# Patient Record
Sex: MALE | Race: BLACK OR AFRICAN AMERICAN | NOT HISPANIC OR LATINO
[De-identification: names, ages, dates, MRNs, and addresses within clinical notes are randomized per-mention and may not be internally consistent; named-entity substitution may affect disease eponyms.]

---

## 2020-06-25 PROBLEM — Z00.00 ENCOUNTER FOR PREVENTIVE HEALTH EXAMINATION: Status: ACTIVE | Noted: 2020-06-25

## 2020-06-29 ENCOUNTER — APPOINTMENT (OUTPATIENT)
Dept: NEUROLOGY | Facility: CLINIC | Age: 80
End: 2020-06-29
Payer: MEDICARE

## 2020-06-29 VITALS
HEART RATE: 81 BPM | DIASTOLIC BLOOD PRESSURE: 82 MMHG | HEIGHT: 69 IN | WEIGHT: 220 LBS | SYSTOLIC BLOOD PRESSURE: 185 MMHG | BODY MASS INDEX: 32.58 KG/M2 | TEMPERATURE: 97.3 F

## 2020-06-29 DIAGNOSIS — G45.9 TRANSIENT CEREBRAL ISCHEMIC ATTACK, UNSPECIFIED: ICD-10-CM

## 2020-06-29 DIAGNOSIS — Z86.73 PERSONAL HISTORY OF TRANSIENT ISCHEMIC ATTACK (TIA), AND CEREBRAL INFARCTION W/OUT RESIDUAL DEFICITS: ICD-10-CM

## 2020-06-29 PROCEDURE — 99205 OFFICE O/P NEW HI 60 MIN: CPT

## 2020-06-29 NOTE — REASON FOR VISIT
[Consultation] : a consultation visit [FreeTextEntry1] : Numbness in left arm and hand, intermittent numbness to left side.

## 2020-06-29 NOTE — ASSESSMENT
[FreeTextEntry1] : Hank Mueller is a 79 year old man with probable left ulnar neuropathy and TIA. \par \par Left ulnar neuropathy-EMG.\par  OT eval. \par \par TIA- he is already on statin, antiplatelet agent and anti-HTN medications for stroke risk reduction. DM control. \par I recommend MRI brain, MRA head and neck w/o and echocardiogram. \par Lipid profile, HgA1C.\par \par Follow up in 3 months.

## 2020-06-29 NOTE — CONSULT LETTER
[Dear  ___] : Dear  [unfilled], [FreeTextEntry1] : I had the pleasure of evaluating your patient, JEN FUENTES, Please see the assessment section below for a summary of my diagnostic impression and plan.\par \par Thank you very much for allowing me to participate in the care of this patient. If you have any questions, please do not hesitate to contact me.\par \par Sincerely,\par \par Lucia Teague MD\par \par \par

## 2020-06-29 NOTE — HISTORY OF PRESENT ILLNESS
[FreeTextEntry1] : Hank Mueller is a 79 year old man with a history of TIA which presented as slurred speech 7 years ago.\par He presents today for a consultation for left hand numbness and clumsiness.  \par \par He reports his left hand primarily around the fifth digit was stiff and numb for about three weeks with weakness in the hand and spasms in his left bicep. \par \par He also had intermittent numbness to his left lip which started three months ago with three episodes.  When he gets the numbness to the lip he reports the whole left side is numb. He takes aspirin 81mg and falls asleep. When he wakes up, the symptoms resolve. He had no change in his speech during these episodes.\par \par He works as a .  \par \par The remaining neurological review of systems is negative.

## 2020-06-29 NOTE — PHYSICAL EXAM
[FreeTextEntry1] : Physical examination \par General: No acute distress, Awake, Alert.   \par \par Mental status \par Awake, alert, Normal attention span and concentration, Language intact.\par \par Cranial Nerves \par II: VFF  \par III, IV, VI: PERRL, EOMI.   \par V: Facial sensation is normal B/L.   \par VII: Facial strength is normal B/L. \par \par \par VIII: Decreased hearing, bilaterally. Left worse than right. \par \par IX, X: Palate is midline and elevates symmetrically.   \par XI: Trapezius normal strength.   \par XII: Tongue midline without atrophy or fasciculations. \par \par Motor exam  \par Muscle tone - no evidence of rigidity or resistance in all 4 extremities.  \par No atrophy or fasciculations \par Muscle Strength: arms and legs, proximal and distal flexors and extensors are normal except finger abduction and adduction weakness on the left.  Left flexor carpi ulnaris weakness.\par \par No UE drift.\par \par Reflexes \par Diffuse hyporeflexia including ankles\par \par Plantars right: mute.   \par Plantars left: mute.   \par \par \par Coordination \par Finger to nose: Normal.  \par Heel to shin: Normal.   \par \par Slow, no ataxia - FNF with past pointing with left hand, LISA, HKS. \par \par Sensory \par Decreased vibration left great toe.\par Decreased vibration right MM.\par PP and cold sensation intact in the hands and feet including the left hand. \par \par Gait \par Slow, wide based gait. \par \par

## 2020-07-10 ENCOUNTER — RESULT REVIEW (OUTPATIENT)
Age: 80
End: 2020-07-10

## 2020-07-13 LAB
CHOLEST SERPL-MCNC: 165 MG/DL
CHOLEST/HDLC SERPL: 3.1 RATIO
ESTIMATED AVERAGE GLUCOSE: 189 MG/DL
HBA1C MFR BLD HPLC: 8.2 %
HDLC SERPL-MCNC: 54 MG/DL
LDLC SERPL CALC-MCNC: 81 MG/DL
TRIGL SERPL-MCNC: 154 MG/DL

## 2021-02-08 ENCOUNTER — APPOINTMENT (OUTPATIENT)
Dept: NEUROLOGY | Facility: CLINIC | Age: 81
End: 2021-02-08
Payer: MEDICARE

## 2021-02-08 VITALS
WEIGHT: 227 LBS | HEART RATE: 84 BPM | HEIGHT: 69 IN | DIASTOLIC BLOOD PRESSURE: 83 MMHG | SYSTOLIC BLOOD PRESSURE: 170 MMHG | BODY MASS INDEX: 33.62 KG/M2 | TEMPERATURE: 97.2 F

## 2021-02-08 DIAGNOSIS — M19.90 UNSPECIFIED OSTEOARTHRITIS, UNSPECIFIED SITE: ICD-10-CM

## 2021-02-08 DIAGNOSIS — R20.2 PARESTHESIA OF SKIN: ICD-10-CM

## 2021-02-08 DIAGNOSIS — M25.50 PAIN IN UNSPECIFIED JOINT: ICD-10-CM

## 2021-02-08 PROCEDURE — 99072 ADDL SUPL MATRL&STAF TM PHE: CPT

## 2021-02-08 PROCEDURE — 99214 OFFICE O/P EST MOD 30 MIN: CPT

## 2021-02-08 RX ORDER — CLOTRIMAZOLE 10 MG/ML
1 SOLUTION TOPICAL
Qty: 30 | Refills: 0 | Status: DISCONTINUED | COMMUNITY
Start: 2020-08-31

## 2021-02-08 RX ORDER — METFORMIN HYDROCHLORIDE 500 MG/1
500 TABLET, COATED ORAL
Qty: 180 | Refills: 0 | Status: ACTIVE | COMMUNITY
Start: 2020-11-10

## 2021-02-08 RX ORDER — HYDROCORTISONE 1 %
12 CREAM (GRAM) TOPICAL
Qty: 400 | Refills: 0 | Status: DISCONTINUED | COMMUNITY
Start: 2020-08-31

## 2021-02-08 RX ORDER — VALACYCLOVIR 1 G/1
1 TABLET, FILM COATED ORAL
Qty: 21 | Refills: 0 | Status: DISCONTINUED | COMMUNITY
Start: 2020-11-24

## 2021-02-08 RX ORDER — GLIMEPIRIDE 2 MG/1
2 TABLET ORAL
Qty: 30 | Refills: 0 | Status: ACTIVE | COMMUNITY
Start: 2021-01-29

## 2021-02-08 RX ORDER — LOSARTAN POTASSIUM 100 MG/1
100 TABLET, FILM COATED ORAL
Qty: 90 | Refills: 0 | Status: ACTIVE | COMMUNITY
Start: 2021-02-04

## 2021-02-08 RX ORDER — SILDENAFIL 100 MG/1
100 TABLET, FILM COATED ORAL
Qty: 5 | Refills: 0 | Status: DISCONTINUED | COMMUNITY
Start: 2020-09-01

## 2021-02-08 RX ORDER — HYDROCHLOROTHIAZIDE 25 MG/1
25 TABLET ORAL
Qty: 90 | Refills: 0 | Status: DISCONTINUED | COMMUNITY
Start: 2020-11-03

## 2021-02-08 RX ORDER — ROSUVASTATIN CALCIUM 10 MG/1
10 TABLET, FILM COATED ORAL
Qty: 90 | Refills: 0 | Status: ACTIVE | COMMUNITY
Start: 2020-12-28

## 2021-02-08 RX ORDER — DOXAZOSIN 4 MG/1
4 TABLET ORAL
Qty: 90 | Refills: 0 | Status: ACTIVE | COMMUNITY
Start: 2020-11-10

## 2021-02-08 RX ORDER — CLOPIDOGREL BISULFATE 75 MG/1
75 TABLET, FILM COATED ORAL
Qty: 90 | Refills: 0 | Status: ACTIVE | COMMUNITY
Start: 2020-12-15

## 2021-02-08 RX ORDER — ASPIRIN 81 MG
81 TABLET, DELAYED RELEASE (ENTERIC COATED) ORAL
Refills: 0 | Status: ACTIVE | COMMUNITY

## 2021-02-09 NOTE — PHYSICAL EXAM
[FreeTextEntry1] : Physical examination \par General: No acute distress, Awake, Alert.   \par \par Mental status \par Awake, alert, Normal attention span and concentration, Language intact.\par \par Cranial Nerves \par II: VFF  \par III, IV, VI: PERRL, EOMI.   \par V: Facial sensation is normal B/L.   \par VII: Facial strength is normal B/L. \par \par \par VIII: Decreased hearing, bilaterally. Left worse than right. \par \par IX, X: Palate is midline and elevates symmetrically.   \par XI: Trapezius normal strength.   \par XII: Tongue midline without atrophy or fasciculations. \par \par Motor exam  \par Muscle tone - no evidence of rigidity or resistance in all 4 extremities.  \par No atrophy or fasciculations \par Muscle Strength: arms and legs, proximal and distal flexors and extensors are normal except finger abduction and adduction weakness on the left.  Left flexor carpi ulnaris weakness during previous visit and now with no weakness on exam today.\par \par \par No UE drift.\par \par Reflexes \par Diffuse hyporeflexia including ankles\par \par Plantars right: mute.   \par Plantars left: mute.   \par \par \par Coordination \par Finger to nose: Normal.  \par Heel to shin: Normal.   \par \par Slow, no ataxia - FNF with past pointing with left hand, LISA, HKS. \par \par Sensory \par Decreased vibration left great toe.\par Decreased vibration right MM.\par PP and cold sensation intact in the hands and feet including the left hand. \par Positive Tinel's left wrist. \par \par Gait \par Slow, wide based gait. \par \par

## 2021-02-09 NOTE — HISTORY OF PRESENT ILLNESS
[FreeTextEntry1] : Hank Mueller is a 80 year old man with a history of TIA which presented as slurred speech 7 years ago presenting for a follow up appointment for slowly progressively worsening left hand clumsiness and numbness. \par Left hand - Initially had numbness in the 5th digit and now in the 3,4,5th digits. \par He has significant joint pain with limited ROM and is unable to make a fist.\par Right hand is fine.  \par \par He is currently taking Plavix, Losartan, Rosuvastatin, Metformin, Aspirin, and Glimepiride for stroke prevention.\par \par He has ongoing pain in his left hand. \par \par The remaining neurological review of systems is negative. \par \par 6/29/20\par Hank Mueller is a 79 year old man with a history of TIA which presented as slurred speech 7 years ago.\par He presents today for a consultation for left hand numbness and clumsiness.  \par \par He reports his left hand primarily around the fifth digit was stiff and numb for about three weeks with weakness in the hand and spasms in his left bicep. \par \par He also had intermittent numbness to his left lip which started three months ago with three episodes.  When he gets the numbness to the lip he reports the whole left side is numb. He takes aspirin 81mg and falls asleep. When he wakes up, the symptoms resolve. He had no change in his speech during these episodes.\par \par He works as a .  \par \par The remaining neurological review of systems is negative.

## 2021-02-09 NOTE — DATA REVIEWED
[de-identified] : 7/10/20 No acute/recent infarct or hemorrhage. Moderate to extensive white matter disease. Moderate pontine small vessel ischemic disease. Moderate T2/FLAIR hyperintensity within left mastoid air cells related to mucosal thickenings/mastoiditis. Clinical correlation. [de-identified] : July 13, 2020 LDL 81\par July 13, 2020 HgA1C 8.2\par \par 7/10/20 Moderate to significant stenosis at junction of distal left M1 segment origin of M2 segments with flow related enhancement beyond this region into the MCA hemispheric branches. No evidence of significant stenosis or occlusion of right MCA or MARCO branches. Dominant right vertebral artery continuing into patent basilar artery. \par \par 7/10/20 MRA neck: limited study secondary to patient motion. no evidence of carotid or vertebral artery significant stenosis, occlusion, or dissection.

## 2021-02-09 NOTE — ASSESSMENT
[FreeTextEntry1] : Hank Mueller is a 80 year old man with probable left ulnar neuropathy and H/O TIA. \par \par Left ulnar neuropathy-EMG/NCs and f/u with Dr. Malachi Garcia\par \par \par H/O TIA- he is already on statin, antiplatelet agent and anti-HTN medications for stroke risk reduction. DM control. \par  MRI brain, MRA head and neck reviewed previously with patient.\par \par \par Arthritis- Rheumatology referral. \par \par Follow up in 3 months.

## 2021-04-19 ENCOUNTER — APPOINTMENT (OUTPATIENT)
Dept: NEUROLOGY | Facility: CLINIC | Age: 81
End: 2021-04-19

## 2021-05-18 ENCOUNTER — APPOINTMENT (OUTPATIENT)
Dept: NEUROLOGY | Facility: CLINIC | Age: 81
End: 2021-05-18
Payer: MEDICARE

## 2021-05-18 DIAGNOSIS — G56.00 CARPAL TUNNEL SYNDROME, UNSPECIFIED UPPER LIMB: ICD-10-CM

## 2021-05-18 PROCEDURE — 95886 MUSC TEST DONE W/N TEST COMP: CPT

## 2021-05-18 PROCEDURE — 99072 ADDL SUPL MATRL&STAF TM PHE: CPT

## 2021-05-18 PROCEDURE — 95911 NRV CNDJ TEST 9-10 STUDIES: CPT

## 2021-05-19 PROBLEM — G56.00 MEDIAN NERVE ENTRAPMENT: Status: ACTIVE | Noted: 2021-05-19

## 2021-05-19 NOTE — PROCEDURE
[FreeTextEntry3] : EMG/NCS Performed\par Please see scanned document in the Neurology section for full report.\par \par

## 2021-06-23 ENCOUNTER — APPOINTMENT (OUTPATIENT)
Dept: NEUROLOGY | Facility: CLINIC | Age: 81
End: 2021-06-23
Payer: MEDICARE

## 2021-06-23 VITALS
HEART RATE: 83 BPM | TEMPERATURE: 97.8 F | WEIGHT: 220 LBS | DIASTOLIC BLOOD PRESSURE: 78 MMHG | SYSTOLIC BLOOD PRESSURE: 153 MMHG | BODY MASS INDEX: 32.49 KG/M2

## 2021-06-23 DIAGNOSIS — Z74.09 OTHER REDUCED MOBILITY: ICD-10-CM

## 2021-06-23 PROCEDURE — 99214 OFFICE O/P EST MOD 30 MIN: CPT

## 2021-06-23 PROCEDURE — 99072 ADDL SUPL MATRL&STAF TM PHE: CPT

## 2021-06-24 NOTE — DATA REVIEWED
[de-identified] : 7/10/20 No acute/recent infarct or hemorrhage. Moderate to extensive white matter disease. Moderate pontine small vessel ischemic disease. Moderate T2/FLAIR hyperintensity within left mastoid air cells related to mucosal thickenings/mastoiditis. Clinical correlation. [de-identified] : 5/18/21 EMG arms - There is electrophysiologic evidence of bilateral median nerve entrapment at the wrist-worse on the left side (involving motor fibers, no acute denervation).   Absent ulnar SNAPs bilaterally - may be due to a non-localizable ulnar neuropathy, bilaterally or may be part of a polyneuropathy. \par July 13, 2020 LDL 81\par July 13, 2020 HgA1C 8.2\par \par 7/10/20 Moderate to significant stenosis at junction of distal left M1 segment origin of M2 segments with flow related enhancement beyond this region into the MCA hemispheric branches. No evidence of significant stenosis or occlusion of right MCA or MARCO branches. Dominant right vertebral artery continuing into patent basilar artery. \par \par 7/10/20 MRA neck: limited study secondary to patient motion. no evidence of carotid or vertebral artery significant stenosis, occlusion, or dissection.

## 2021-06-24 NOTE — END OF VISIT
Was switched to generic vytorin 10/20  Will check labs in near future  Will call  [Time Spent: ___ minutes] : I have spent [unfilled] minutes of time on the encounter.

## 2021-06-24 NOTE — PHYSICAL EXAM
[FreeTextEntry1] : Physical examination \par General: No acute distress, Awake, Alert.   \par \par Mental status \par Awake, alert, Normal attention span and concentration, Language intact.\par \par Cranial Nerves \par II: VFF  \par III, IV, VI: PERRL, EOMI.   \par V: Facial sensation is normal B/L.   \par VII: Facial strength is normal B/L. \par \par \par VIII: Decreased hearing, bilaterally. Left worse than right. \par \par IX, X: Palate is midline and elevates symmetrically.   \par XI: Trapezius normal strength.   \par XII: Tongue midline without atrophy or fasciculations. \par \par Motor exam  \par Muscle tone - no evidence of rigidity or resistance in all 4 extremities.  \par No atrophy or fasciculations \par Muscle Strength: arms and legs, proximal and distal flexors and extensors are normal except finger abduction and adduction weakness on the left.  Left flexor carpi ulnaris weakness.\par \par \par No UE drift.\par \par Reflexes \par Diffuse hyporeflexia including ankles\par \par Plantars right: mute.   \par Plantars left: mute.   \par \par \par Coordination \par Finger to nose: Normal.  \par Heel to shin: Normal.   \par \par Slow, no ataxia - FNF with past pointing with left hand, LISA, HKS. \par \par Sensory \par Decreased vibration MM B.\par Proprioception intact.\par Decreased PP first three digits B. \par Decreased cold left hand up to wrist.\par Decreased PP left foot up to ankle. \par  \par Positive Tinel's left wrist. \par \par Gait \par Slow, wide based gait. \par \par

## 2021-06-24 NOTE — CONSULT LETTER
[Dear  ___] : Dear  [unfilled], [FreeTextEntry1] : I had the pleasure of evaluating your patient, JEN FUENTES, Please see the assessment section below for a summary of my diagnostic impression and plan.\par \par Thank you very much for allowing me to participate in the care of this patient. If you have any questions, please do not hesitate to contact me.\par \par Sincerely,\par \par Lucia Teague MD\par Kourtney Terrazas N.P.\par \par \par

## 2021-06-24 NOTE — HISTORY OF PRESENT ILLNESS
[FreeTextEntry1] : Hank Mueller is a 80 year old man with a history of TIA which presented as slurred speech 7 years ago presenting for a follow up appointment for slowly progressively worsening left hand clumsiness and numbness. \par Left hand -continued numbness and pain throughout. Has appointment with rheumatologist. \par He has significant joint pain with limited ROM and is unable to make a fist.\par Right hand mild numbness. Did not do occupational therapy. Needs to follow up with Dr. Garcia.\par Overall, he is having balance difficulties. No falls. Able to perform his ADLs. \par \par He is currently taking Plavix, Losartan, Rosuvastatin, Metformin, Aspirin, and Glimepiride for stroke prevention.\par \par History of diabetes for many years. \par \par The remaining neurological review of systems is negative. \par \par 2/8/21\par Hank Mueller is a 80 year old man with a history of TIA which presented as slurred speech 7 years ago presenting for a follow up appointment for slowly progressively worsening left hand clumsiness and numbness. \par Left hand - Initially had numbness in the 5th digit and now in the 3,4,5th digits. \par He has significant joint pain with limited ROM and is unable to make a fist.\par Right hand is fine.  \par \par He is currently taking Plavix, Losartan, Rosuvastatin, Metformin, Aspirin, and Glimepiride for stroke prevention.\par \par He has ongoing pain in his left hand. \par \par The remaining neurological review of systems is negative. \par \par 6/29/20\par Hank Mueller is a 79 year old man with a history of TIA which presented as slurred speech 7 years ago.\par He presents today for a consultation for left hand numbness and clumsiness.  \par \par He reports his left hand primarily around the fifth digit was stiff and numb for about three weeks with weakness in the hand and spasms in his left bicep. \par \par He also had intermittent numbness to his left lip which started three months ago with three episodes.  When he gets the numbness to the lip he reports the whole left side is numb. He takes aspirin 81mg and falls asleep. When he wakes up, the symptoms resolve. He had no change in his speech during these episodes.\par \par He works as a .  \par \par The remaining neurological review of systems is negative.

## 2021-06-24 NOTE — ASSESSMENT
[FreeTextEntry1] : Hank Mueller is a 80 year old man withEMG consistent with bilateral carpal tunnel syndrome - moderate on the left and mild on the right.   Also, absent ulnar SNAPs bilaterally which may be due to a non-localizable ulnar neuropathy, bilaterally or may be part of a polyneuropathy. \par \par Clinically he has signs of a left ulnar neuropathy with weakness -  f/u with Dr. Malachi Garcia - results discussed with him.  \par Occupational therapy.\par \par H/O TIA- he is already on statin, antiplatelet agent and anti-HTN medications for stroke risk reduction. DM control. \par  MRI brain, MRA head and neck reviewed previously with patient.\par \par Gait and balance difficulties- PT referral. \par \par Arthritis- Rheumatology referral. \par \par Follow up in 3 months. \par \par I discussed in detail with the patient and family the diagnosis, prognosis, treatment plan and answered all of their questions.\par \par

## 2021-09-16 ENCOUNTER — RESULT REVIEW (OUTPATIENT)
Age: 81
End: 2021-09-16

## 2021-09-16 ENCOUNTER — APPOINTMENT (OUTPATIENT)
Dept: RHEUMATOLOGY | Facility: CLINIC | Age: 81
End: 2021-09-16
Payer: MEDICARE

## 2021-09-16 ENCOUNTER — LABORATORY RESULT (OUTPATIENT)
Age: 81
End: 2021-09-16

## 2021-09-16 VITALS
SYSTOLIC BLOOD PRESSURE: 150 MMHG | DIASTOLIC BLOOD PRESSURE: 60 MMHG | HEART RATE: 83 BPM | TEMPERATURE: 96.44 F | WEIGHT: 230 LBS | BODY MASS INDEX: 34.07 KG/M2 | OXYGEN SATURATION: 99 % | HEIGHT: 69 IN

## 2021-09-16 DIAGNOSIS — E78.5 HYPERLIPIDEMIA, UNSPECIFIED: ICD-10-CM

## 2021-09-16 DIAGNOSIS — B02.9 ZOSTER W/OUT COMPLICATIONS: ICD-10-CM

## 2021-09-16 DIAGNOSIS — E11.9 TYPE 2 DIABETES MELLITUS W/OUT COMPLICATIONS: ICD-10-CM

## 2021-09-16 DIAGNOSIS — I10 ESSENTIAL (PRIMARY) HYPERTENSION: ICD-10-CM

## 2021-09-16 PROCEDURE — 99205 OFFICE O/P NEW HI 60 MIN: CPT

## 2021-09-16 RX ORDER — PREDNISONE 10 MG/1
10 TABLET ORAL DAILY
Qty: 7 | Refills: 0 | Status: COMPLETED | COMMUNITY
Start: 2021-09-16 | End: 2021-09-23

## 2021-09-16 NOTE — HISTORY OF PRESENT ILLNESS
[FreeTextEntry1] : Patient reports that he has been having numbness and tingling in the hands and was Dx'ed with CTS and ulnar neuropathy by neurology, but he also complained of hand stiffness, mostly in the morning, with difficulty making a fist, for about one year.  He is also having difficulty standing from a sitting position. No shoulder symptoms. There has been no fever, HAs, weight loss or claudication. There has been no rash or skin photosensitivity, serositis, Raynaud's, ocular, GI or inflammatory  symptoms, or other associated symptoms.

## 2021-09-16 NOTE — PHYSICAL EXAM
[General Appearance - Alert] : alert [General Appearance - In No Acute Distress] : in no acute distress [General Appearance - Well Nourished] : well nourished [General Appearance - Well Developed] : well developed [Sclera] : the sclera and conjunctiva were normal [Auscultation Breath Sounds / Voice Sounds] : lungs were clear to auscultation bilaterally [Cervical Lymph Nodes Enlarged Posterior Bilaterally] : posterior cervical [Cervical Lymph Nodes Enlarged Anterior Bilaterally] : anterior cervical [] : no rash [Motor Exam] : the motor exam was normal [FreeTextEntry1] : There is pain on ROM of the wirsts, left >> right, with joint ine tenderness of the left wrist. Ther eis palpable swelling of the MCP joints on the left with tenderness. There is terderness of the PIP joints on the left - no tenderness of the MCP or PIP joints on the right.  strength on the left is decreased. There is a Dupytrens contracture right fourth flexor tendon. There is warmth, swelling and joint ilne tenderness of the right ankle. There is minimal joint line tenderness of the left ankle. There is scattered MTP tenderness bilaterally without palpable swelling. There is no other synovitis noted.

## 2021-09-16 NOTE — REASON FOR VISIT
[Initial Evaluation] : an initial evaluation [Spouse] : spouse [FreeTextEntry1] : numbness of the hands; stiffness, especially in the mornings

## 2021-09-16 NOTE — REVIEW OF SYSTEMS
[Joint Pain] : joint pain [Joint Swelling] : joint swelling [Joint Stiffness] : joint stiffness [Fever] : no fever [Chills] : no chills [Recent Weight Loss (___ Lbs)] : no recent weight loss [Eye Pain] : no eye pain [Red Eyes] : eyes not red [Shortness Of Breath] : no shortness of breath [Cough] : no cough [Abdominal Pain] : no abdominal pain [Diarrhea] : no diarrhea [Dysuria] : no dysuria [Skin Lesions] : no skin lesions [FreeTextEntry6] : No pleuritic C/P

## 2021-09-20 LAB
ACE BLD-CCNC: 39 U/L
ALBUMIN SERPL ELPH-MCNC: 4.5 G/DL
ALP BLD-CCNC: 130 U/L
ALT SERPL-CCNC: 28 U/L
ANA SER IF-ACNC: NEGATIVE
ANION GAP SERPL CALC-SCNC: 15 MMOL/L
AST SERPL-CCNC: 24 U/L
BASOPHILS # BLD AUTO: 0.03 K/UL
BASOPHILS NFR BLD AUTO: 0.7 %
BILIRUB SERPL-MCNC: 0.3 MG/DL
BUN SERPL-MCNC: 20 MG/DL
CALCIUM SERPL-MCNC: 9.9 MG/DL
CCP AB SER IA-ACNC: <8 UNITS
CHLORIDE SERPL-SCNC: 104 MMOL/L
CO2 SERPL-SCNC: 24 MMOL/L
CREAT SERPL-MCNC: 1.21 MG/DL
CRP SERPL-MCNC: <3 MG/L
DSDNA AB SER-ACNC: <12 IU/ML
ENA SS-A AB SER IA-ACNC: <0.2 AL
ENA SS-B AB SER IA-ACNC: <0.2 AL
EOSINOPHIL # BLD AUTO: 1.04 K/UL
EOSINOPHIL NFR BLD AUTO: 25.5 %
ERYTHROCYTE [SEDIMENTATION RATE] IN BLOOD BY WESTERGREN METHOD: 27 MM/HR
GLUCOSE SERPL-MCNC: 129 MG/DL
HCT VFR BLD CALC: 39.3 %
HGB BLD-MCNC: 12.8 G/DL
IMM GRANULOCYTES NFR BLD AUTO: 0 %
LYMPHOCYTES # BLD AUTO: 1.06 K/UL
LYMPHOCYTES NFR BLD AUTO: 26 %
MAN DIFF?: NORMAL
MCHC RBC-ENTMCNC: 29.1 PG
MCHC RBC-ENTMCNC: 32.6 GM/DL
MCV RBC AUTO: 89.3 FL
MONOCYTES # BLD AUTO: 0.55 K/UL
MONOCYTES NFR BLD AUTO: 13.5 %
NEUTROPHILS # BLD AUTO: 1.4 K/UL
NEUTROPHILS NFR BLD AUTO: 34.3 %
PLATELET # BLD AUTO: 180 K/UL
POTASSIUM SERPL-SCNC: 4 MMOL/L
PROT SERPL-MCNC: 7.7 G/DL
RBC # BLD: 4.4 M/UL
RBC # FLD: 14.3 %
RF+CCP IGG SER-IMP: NEGATIVE
RHEUMATOID FACT SER QL: <10 IU/ML
SODIUM SERPL-SCNC: 143 MMOL/L
URATE SERPL-MCNC: 6.6 MG/DL
WBC # FLD AUTO: 4.08 K/UL

## 2021-09-21 LAB — HISTONE AB SER QL: 0.3 UNITS

## 2021-09-23 ENCOUNTER — APPOINTMENT (OUTPATIENT)
Dept: NEUROLOGY | Facility: CLINIC | Age: 81
End: 2021-09-23
Payer: MEDICARE

## 2021-09-23 ENCOUNTER — LABORATORY RESULT (OUTPATIENT)
Age: 81
End: 2021-09-23

## 2021-09-23 VITALS
HEIGHT: 69 IN | TEMPERATURE: 97.2 F | WEIGHT: 230 LBS | DIASTOLIC BLOOD PRESSURE: 87 MMHG | SYSTOLIC BLOOD PRESSURE: 170 MMHG | BODY MASS INDEX: 34.07 KG/M2 | HEART RATE: 75 BPM

## 2021-09-23 VITALS — HEART RATE: 76 BPM | SYSTOLIC BLOOD PRESSURE: 174 MMHG | DIASTOLIC BLOOD PRESSURE: 81 MMHG

## 2021-09-23 DIAGNOSIS — G56.21 LESION OF ULNAR NERVE, RIGHT UPPER LIMB: ICD-10-CM

## 2021-09-23 DIAGNOSIS — M54.5 LOW BACK PAIN: ICD-10-CM

## 2021-09-23 DIAGNOSIS — G62.9 POLYNEUROPATHY, UNSPECIFIED: ICD-10-CM

## 2021-09-23 DIAGNOSIS — G56.03 CARPAL TUNNEL SYNDROM,BILATERAL UPPER LIMBS: ICD-10-CM

## 2021-09-23 DIAGNOSIS — G56.22 LESION OF ULNAR NERVE, LEFT UPPER LIMB: ICD-10-CM

## 2021-09-23 PROCEDURE — 99214 OFFICE O/P EST MOD 30 MIN: CPT

## 2021-09-23 NOTE — DATA REVIEWED
[de-identified] : 7/10/20 No acute/recent infarct or hemorrhage. Moderate to extensive white matter disease. Moderate pontine small vessel ischemic disease. Moderate T2/FLAIR hyperintensity within left mastoid air cells related to mucosal thickenings/mastoiditis. Clinical correlation. [de-identified] : 5/18/21 EMG arms - There is electrophysiologic evidence of bilateral median nerve entrapment at the wrist-worse on the left side (involving motor fibers, no acute denervation).   Absent ulnar SNAPs bilaterally - may be due to a non-localizable ulnar neuropathy, bilaterally or may be part of a polyneuropathy. \par July 13, 2020 LDL 81\par July 13, 2020 HgA1C 8.2\par \par 7/10/20 Moderate to significant stenosis at junction of distal left M1 segment origin of M2 segments with flow related enhancement beyond this region into the MCA hemispheric branches. No evidence of significant stenosis or occlusion of right MCA or MARCO branches. Dominant right vertebral artery continuing into patent basilar artery. \par \par 7/10/20 MRA neck: limited study secondary to patient motion. no evidence of carotid or vertebral artery significant stenosis, occlusion, or dissection.

## 2021-09-23 NOTE — HISTORY OF PRESENT ILLNESS
[FreeTextEntry1] : Hank Mueller is a 81 year old man with a history of TIA, arthritis, diabetes x many years presenting for a follow up appointment for right wrist pain with stiffness in the fourth and fifth digit and pain with walking.\par \par He endorses around his right ankle it feels like heat. Unsure if he has numbness or tingling in his lower extremities. He did not start PT or OT as previously recommended. Mr. Mueller endorses right wrist and right fourth and fifth digit discomfort. He did not follow up with Dr. Garcia.\par He continues to have stiffness in his hands.\par He endorses ongoing balance difficulties. No falls. Able to perform ADLs. \par \par He is currently taking Plavix, Losartan, Rosuvastatin, Metformin, Aspirin, and Glimepiride for stroke prevention.\par \par The remaining neurological review of systems is negative. \par \par 6/23/21\par Hank Mueller is a 80 year old man with a history of TIA which presented as slurred speech 7 years ago presenting for a follow up appointment for slowly progressively worsening left hand clumsiness and numbness. \par Left hand -continued numbness and pain throughout. Has appointment with rheumatologist. \par He has significant joint pain with limited ROM and is unable to make a fist.\par Right hand mild numbness. Did not do occupational therapy. Needs to follow up with Dr. Garcia.\par Overall, he is having balance difficulties. No falls. Able to perform his ADLs. \par \par He is currently taking Plavix, Losartan, Rosuvastatin, Metformin, Aspirin, and Glimepiride for stroke prevention.\par \par History of diabetes for many years. \par \par The remaining neurological review of systems is negative. \par \par 2/8/21\par Hank Mueller is a 80 year old man with a history of TIA which presented as slurred speech 7 years ago presenting for a follow up appointment for slowly progressively worsening left hand clumsiness and numbness. \par Left hand - Initially had numbness in the 5th digit and now in the 3,4,5th digits. \par He has significant joint pain with limited ROM and is unable to make a fist.\par Right hand is fine.  \par \par He is currently taking Plavix, Losartan, Rosuvastatin, Metformin, Aspirin, and Glimepiride for stroke prevention.\par \par He has ongoing pain in his left hand. \par \par The remaining neurological review of systems is negative. \par \par 6/29/20\par Hank Mueller is a 79 year old man with a history of TIA which presented as slurred speech 7 years ago.\par He presents today for a consultation for left hand numbness and clumsiness.  \par \par He reports his left hand primarily around the fifth digit was stiff and numb for about three weeks with weakness in the hand and spasms in his left bicep. \par \par He also had intermittent numbness to his left lip which started three months ago with three episodes.  When he gets the numbness to the lip he reports the whole left side is numb. He takes aspirin 81mg and falls asleep. When he wakes up, the symptoms resolve. He had no change in his speech during these episodes.\par \par He works as a .  \par \par The remaining neurological review of systems is negative.

## 2021-09-23 NOTE — PHYSICAL EXAM
[FreeTextEntry1] : Physical examination \par General: No acute distress, Awake, Alert.   \par \par Mental status \par Awake, alert, Normal attention span and concentration, Language intact.\par \par Cranial Nerves \par II: VFF  \par III, IV, VI: PERRL, EOMI.   \par V: Facial sensation is normal B/L.   \par VII: Facial strength is normal B/L. \par \par \par VIII: Decreased hearing, bilaterally. Left worse than right. \par \par IX, X: Palate is midline and elevates symmetrically.   \par XI: Trapezius normal strength.   \par XII: Tongue midline without atrophy or fasciculations. \par \par Motor exam  \par Muscle tone - no evidence of rigidity or resistance in all 4 extremities.  \par No atrophy or fasciculations \par Muscle Strength: arms and legs, proximal and distal flexors and extensors are normal except finger abduction and adduction weakness on the left.  Left flexor carpi ulnaris weakness.\par \par \par No UE drift.\par \par Reflexes \par Diffuse hyporeflexia including ankles\par \par Plantars right: mute.   \par Plantars left: mute.   \par \par \par Coordination \par Finger to nose: Normal.  \par Heel to shin: Normal.   \par \par Slow, no ataxia - FNF with past pointing with left hand, LISA, HKS. \par \par Sensory \par Decreased vibration MM B.\par Proprioception intact.\par Decreased PP up to wrists B. \par Decreased cold left hand up to wrist.\par Decreased PP up to ankle left and above right ankle.  \par  \par \par Gait \par Slow, wide based gait. \par \par

## 2021-09-23 NOTE — ASSESSMENT
[FreeTextEntry1] : Hank Mueller is a 81 year old man with multiple issues.\par \par Clinically he has signs of a left ulnar neuropathy with weakness -  f/u with Dr. Malachi Garcia - results discussed with him previously.   EMG with absent ulnar SNAPs bilaterally which may be due to a non-localizable ulnar neuropathy, bilaterally or may be part of a polyneuropathy.  No slowing across the elbow to confirm a focal compression neuropathy. \par EMG consistent with bilateral carpal tunnel syndrome - moderate on the left and mild on the right. \par \par \par \par \par Occupational therapy.\par Increased right wrist pain and right ankle pain-partial improvement with Prednisone from rheumatology.\par \par \par \par \par Possible peripheral neuropathy.\par Differential includes neuropathy secondary to sarcoidosis or autoimmune disease.\par Serological workup for other identifiable causes.\par EMG legs.\par \par H/O TIA- he is already on statin, antiplatelet agent and anti-HTN medications for stroke risk reduction. DM control. \par  MRI brain, MRA head and neck reviewed previously with patient.\par \par Gait and balance difficulties- PT referral. 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  \par \par Elevated blood pressure in office- pt did not take his antihypertensive medication yet today. He will take the medication at home and follow up with PCP. \par \par Follow up in 3 months. \par \par Patient seen with Dr. Teague. \par \par I discussed in detail with the patient and family the diagnosis, prognosis, treatment plan and answered all of their questions.\par \par

## 2021-09-24 LAB
B BURGDOR IGG+IGM SER QL IB: NORMAL
TSH SERPL-ACNC: 2.73 UIU/ML
VIT B12 SERPL-MCNC: 949 PG/ML

## 2021-09-27 LAB
ALBUMIN MFR SERPL ELPH: 58.5 %
ALBUMIN SERPL-MCNC: 4.3 G/DL
ALBUMIN/GLOB SERPL: 1.4 RATIO
ALPHA1 GLOB MFR SERPL ELPH: 3.2 %
ALPHA1 GLOB SERPL ELPH-MCNC: 0.2 G/DL
ALPHA2 GLOB MFR SERPL ELPH: 6.9 %
ALPHA2 GLOB SERPL ELPH-MCNC: 0.5 G/DL
B-GLOBULIN MFR SERPL ELPH: 12.1 %
B-GLOBULIN SERPL ELPH-MCNC: 0.9 G/DL
ENA SS-A AB SER IA-ACNC: <0.2 AL
ENA SS-B AB SER IA-ACNC: <0.2 AL
GAMMA GLOB FLD ELPH-MCNC: 1.4 G/DL
GAMMA GLOB MFR SERPL ELPH: 19.3 %
INTERPRETATION SERPL IEP-IMP: NORMAL
M PROTEIN SPEC IFE-MCNC: NORMAL
PROT SERPL-MCNC: 7.4 G/DL
PROT SERPL-MCNC: 7.4 G/DL

## 2021-09-30 LAB — VIT B1 SERPL-MCNC: 93.4 NMOL/L

## 2021-10-04 LAB — VIT B6 SERPL-MCNC: 78.7 UG/L

## 2021-12-20 ENCOUNTER — APPOINTMENT (OUTPATIENT)
Dept: NEUROLOGY | Facility: CLINIC | Age: 81
End: 2021-12-20

## 2022-01-03 ENCOUNTER — APPOINTMENT (OUTPATIENT)
Dept: NEUROLOGY | Facility: CLINIC | Age: 82
End: 2022-01-03

## 2022-01-04 ENCOUNTER — RESULT REVIEW (OUTPATIENT)
Age: 82
End: 2022-01-04

## 2022-01-04 ENCOUNTER — APPOINTMENT (OUTPATIENT)
Dept: HEMATOLOGY ONCOLOGY | Facility: CLINIC | Age: 82
End: 2022-01-04
Payer: MEDICARE

## 2022-01-04 VITALS
HEIGHT: 67.72 IN | TEMPERATURE: 97.8 F | RESPIRATION RATE: 18 BRPM | DIASTOLIC BLOOD PRESSURE: 82 MMHG | SYSTOLIC BLOOD PRESSURE: 163 MMHG | WEIGHT: 237.5 LBS | BODY MASS INDEX: 36.41 KG/M2 | OXYGEN SATURATION: 99 % | HEART RATE: 94 BPM

## 2022-01-04 DIAGNOSIS — D70.9 NEUTROPENIA, UNSPECIFIED: ICD-10-CM

## 2022-01-04 DIAGNOSIS — D72.10 EOSINOPHILIA, UNSPECIFIED: ICD-10-CM

## 2022-01-04 DIAGNOSIS — Z80.0 FAMILY HISTORY OF MALIGNANT NEOPLASM OF DIGESTIVE ORGANS: ICD-10-CM

## 2022-01-04 DIAGNOSIS — Z80.3 FAMILY HISTORY OF MALIGNANT NEOPLASM OF BREAST: ICD-10-CM

## 2022-01-04 PROCEDURE — 36415 COLL VENOUS BLD VENIPUNCTURE: CPT

## 2022-01-04 PROCEDURE — 99205 OFFICE O/P NEW HI 60 MIN: CPT | Mod: 25

## 2022-01-04 NOTE — HISTORY OF PRESENT ILLNESS
[de-identified] : Mr. Hank Mueller is 81 year old male with normocytic anemia, neutropenia  referred by Dr. Ruy Perez\par \par Patient with past medical history of HTN, hyperlipidemia, type 2 diabetes, multiple TIA,  prostate cancer (dx at age 42 s/p radiation) with elevation PSA of 52 last year,  received 4 Lupron injection thus far with Dr. Randolph Montgomery in [place], last PSA was 8. 49. He has had both Pet axumin and CT done which were neg for metastatic disease, last scans were about three years ago. \par \par 9/20/21 Hgb 12.8 hct 39.3 ANC 1.40 Eosinophils # 1.06\par \par FHx: M. uncle with colon cancer in his 60s.\par Mother with breast cancer at age \par Have not had a colonoscopy, had cologuard at home that was ordered by Dr. Perez which patient has not done. \par Patient with hot flashes and fatigue since receiving Lupron. \par

## 2022-01-04 NOTE — CONSULT LETTER
[Dear  ___] : Dear  [unfilled], [Consult Letter:] : I had the pleasure of evaluating your patient, [unfilled]. [Please see my note below.] : Please see my note below. [Consult Closing:] : Thank you very much for allowing me to participate in the care of this patient.  If you have any questions, please do not hesitate to contact me. [Sincerely,] : Sincerely, [FreeTextEntry3] : Brown Wheeler MD, MPH\par  of Medicine [facility] [employer] at [facility]\par Attending Physician \par Hematology and Medical Oncology\par [facility]\par

## 2022-01-04 NOTE — ASSESSMENT
[FreeTextEntry1] : Normocytic anemia\par Discussed at length about the differential diagnosis including nutritional (iron def, B12 def, Folate Def); hemolysis; anemia of kidney disease; anemia of inflammation; multiple myeloma; drug induced; bone marrow conditions such as MDS, aplastic anemia, myelofibrosis; etc\par Send blood work\par \par Neutropenia\par Eosinophilia\par Discussed about differential diagnosis\par Send blood work including flow cytometry and BCR-ABL\par \par Prostate cancer \par Diagnosed when he was 42 years old\par Had radiation (40 sessions) \par In 2018 - PSA went up to 52 -> went to Dr Montgomery (Urology, [facility])\par Had axumin PET/CT which was negative for metastases\par PSA improved from 19 and latest was 8.4\par Repeat PSA and testosterone\par \par Family ho cancer\par Maternal uncle - colon cancer 69 years old\par Mother - breast cancer 70s\par Paternal cousin - stomach cancer\par Genetic testing at some point\par \par Patient and his wife had multiple questions which were answered to satisfaction\par Carmen (wife)-  for 3 medical practices- offered most information and seemed to be the decision maker\par \par Follow up in 3 weeks\par No labs

## 2022-01-04 NOTE — PHYSICAL EXAM
[Restricted in physically strenuous activity but ambulatory and able to carry out work of a light or sedentary nature] : Status 1- Restricted in physically strenuous activity but ambulatory and able to carry out work of a light or sedentary nature, e.g., light house work, office work [Obese] : obese [Normal] : normoactive bowel sounds, soft and nontender, no hepatosplenomegaly or masses appreciated

## 2022-01-25 ENCOUNTER — APPOINTMENT (OUTPATIENT)
Dept: RHEUMATOLOGY | Facility: CLINIC | Age: 82
End: 2022-01-25
Payer: MEDICARE

## 2022-01-25 VITALS
BODY MASS INDEX: 21.46 KG/M2 | DIASTOLIC BLOOD PRESSURE: 60 MMHG | WEIGHT: 140 LBS | HEART RATE: 102 BPM | HEIGHT: 67.72 IN | SYSTOLIC BLOOD PRESSURE: 142 MMHG | OXYGEN SATURATION: 97 % | TEMPERATURE: 98.1 F

## 2022-01-25 DIAGNOSIS — M06.4 INFLAMMATORY POLYARTHROPATHY: ICD-10-CM

## 2022-01-25 PROCEDURE — 99214 OFFICE O/P EST MOD 30 MIN: CPT

## 2022-01-25 RX ORDER — HYDROXYCHLOROQUINE SULFATE 200 MG/1
200 TABLET, FILM COATED ORAL
Qty: 180 | Refills: 1 | Status: ACTIVE | COMMUNITY
Start: 2022-01-25 | End: 1900-01-01

## 2022-01-25 NOTE — PHYSICAL EXAM
[General Appearance - Alert] : alert [General Appearance - In No Acute Distress] : in no acute distress [General Appearance - Well Nourished] : well nourished [General Appearance - Well Developed] : well developed [] : no rash [Motor Exam] : the motor exam was normal [FreeTextEntry1] : There is pain on ROM of the wrists, left >> right, with joint line tenderness of the left wrist. There is palpable swelling of the MCP joints on the left with tenderness. There is tenderness of the PIP joints on the left - no tenderness of the MCP or PIP joints on the right.  strength on the left is decreased significantly. There is a Dupytrens contracture right fourth flexor tendon. There is warmth, swelling and joint ilne tenderness of the right ankle. There is minimal joint line tenderness of the left ankle. There is scattered MTP tenderness bilaterally without palpable swelling. There is no other synovitis noted.

## 2022-01-25 NOTE — HISTORY OF PRESENT ILLNESS
[FreeTextEntry1] : Patient does not recall the effect the steroid had on his joints, but his spouse reports he mentioned he felt much better while taking one week of steroid in September. Still having symptoms, and hand joint symptoms have progressed a bit.

## 2022-01-28 ENCOUNTER — APPOINTMENT (OUTPATIENT)
Dept: HEMATOLOGY ONCOLOGY | Facility: CLINIC | Age: 82
End: 2022-01-28
Payer: MEDICARE

## 2022-01-28 VITALS
HEART RATE: 87 BPM | RESPIRATION RATE: 16 BRPM | HEIGHT: 67.72 IN | TEMPERATURE: 97.2 F | OXYGEN SATURATION: 96 % | WEIGHT: 242.56 LBS | DIASTOLIC BLOOD PRESSURE: 68 MMHG | BODY MASS INDEX: 37.19 KG/M2 | SYSTOLIC BLOOD PRESSURE: 187 MMHG

## 2022-01-28 DIAGNOSIS — C61 MALIGNANT NEOPLASM OF PROSTATE: ICD-10-CM

## 2022-01-28 DIAGNOSIS — R97.20 ELEVATED PROSTATE, SPECIFIC ANTIGEN [PSA]: ICD-10-CM

## 2022-01-28 PROCEDURE — 99214 OFFICE O/P EST MOD 30 MIN: CPT

## 2022-01-28 NOTE — HISTORY OF PRESENT ILLNESS
[de-identified] : Patient is seen today for follow up\par Accompanied by his wife (Carmen)\par \par Feels tired and fatigued\par Gets lupron with his urologist 12/18/21. Gets  3 month depot\sherrill \sherrill Saw Dr Ott- was placed him on plaquenil 200 mg for possible RA [de-identified] : Mr. Hank Mueller is 81 year old male with normocytic anemia, neutropenia  referred by Dr. Ruy Perez\par \par Patient with past medical history of HTN, hyperlipidemia, type 2 diabetes, multiple TIA,  prostate cancer (dx at age 42 s/p radiation) with elevation PSA of 52 last year,  received 4 Lupron injection thus far with Dr. Randolph Montgomery in [place], last PSA was 8. 49. He has had both Pet axumin and CT done which were neg for metastatic disease, last scans were about three years ago. \par \par 9/20/21 Hgb 12.8 hct 39.3 ANC 1.40 Eosinophils # 1.06\par \par FHx: M. uncle with colon cancer in his 60s.\par Mother with breast cancer at age \par Have not had a colonoscopy, had cologuard at home that was ordered by Dr. Perez which patient has not done. \par Patient with hot flashes and fatigue since receiving Lupron. \par

## 2022-01-28 NOTE — ASSESSMENT
[FreeTextEntry1] : Normocytic anemia\par Discussed at length about the differential diagnosis including nutritional (iron def, B12 def, Folate Def); hemolysis; anemia of kidney disease; anemia of inflammation; multiple myeloma; drug induced; bone marrow conditions such as MDS, aplastic anemia, myelofibrosis; etc\par Blood work suggestive of mild BAN\par Discussed at length about iron deficiency- etiology, signs and symptoms, complications, management options\par DIscussed about oral vs IV Iron\par I have reviewed the risks, benefits and side effects of IV iron with the patient. All questions were answered to satisfaction. Patient agrees to pursue IV iron\par Schedule IV venofer 200 mg x 3\par Work up to rule out bone mets from prostate cancer\par \par Neutropenia\par Eosinophilia\par Discussed about differential diagnosis\par Blood work including flow cytometry and BCR-ABL- normal\par \par Prostate cancer \par Diagnosed when he was 42 years old\par Had radiation (40 sessions) \par In 2018 - PSA went up to 52 -> went to Dr Montgomery (Urology, [facility])\par Had axumin PET/CT which was negative for metastases\par PSA improved from 19 and latest was 8.4\par Repeat PSA rising\par Obtain PSMA PET CT\par \par Fatigue\par Likely from lupron, iron deficiency\par \par Family ho cancer\par Maternal uncle - colon cancer 69 years old\par Mother - breast cancer 70s\par Paternal cousin - stomach cancer\par Genetic testing at some point\par \par Patient and his wife had multiple questions which were answered to satisfaction\par Carmen (wife)-  for 3 medical practices- offered most information and seemed to be the decision maker\par \par Follow up in 3 weeks (on the day he comes for his 3rd iron infusion\par No labs

## 2022-01-28 NOTE — CONSULT LETTER
[Dear  ___] : Dear  [unfilled], [Consult Letter:] : I had the pleasure of evaluating your patient, [unfilled]. [Please see my note below.] : Please see my note below. [Consult Closing:] : Thank you very much for allowing me to participate in the care of this patient.  If you have any questions, please do not hesitate to contact me. [Sincerely,] : Sincerely, [FreeTextEntry3] : Brown Wheeler MD, MPH\par  of Medicine [facility] [employer] at [facility]\par Attending Physician \par Hematology and Medical Oncology\par [facility]\par

## 2022-01-28 NOTE — REVIEW OF SYSTEMS
[Night Sweats] : night sweats [Fatigue] : fatigue [Negative] : Allergic/Immunologic [FreeTextEntry2] : Review of systems negative except as outlined in HPI

## 2022-03-16 ENCOUNTER — APPOINTMENT (OUTPATIENT)
Dept: HEMATOLOGY ONCOLOGY | Facility: CLINIC | Age: 82
End: 2022-03-16
Payer: MEDICARE

## 2022-03-22 ENCOUNTER — NON-APPOINTMENT (OUTPATIENT)
Age: 82
End: 2022-03-22

## 2022-04-05 ENCOUNTER — RESULT REVIEW (OUTPATIENT)
Age: 82
End: 2022-04-05

## 2022-04-05 ENCOUNTER — APPOINTMENT (OUTPATIENT)
Dept: HEMATOLOGY ONCOLOGY | Facility: CLINIC | Age: 82
End: 2022-04-05
Payer: MEDICARE

## 2022-04-05 VITALS
RESPIRATION RATE: 18 BRPM | SYSTOLIC BLOOD PRESSURE: 126 MMHG | HEIGHT: 67.72 IN | HEART RATE: 99 BPM | OXYGEN SATURATION: 100 % | TEMPERATURE: 97.8 F | BODY MASS INDEX: 36.19 KG/M2 | WEIGHT: 236 LBS | DIASTOLIC BLOOD PRESSURE: 82 MMHG

## 2022-04-05 DIAGNOSIS — C61 MALIGNANT NEOPLASM OF PROSTATE: ICD-10-CM

## 2022-04-05 DIAGNOSIS — D50.9 IRON DEFICIENCY ANEMIA, UNSPECIFIED: ICD-10-CM

## 2022-04-05 DIAGNOSIS — D64.9 ANEMIA, UNSPECIFIED: ICD-10-CM

## 2022-04-05 PROCEDURE — 99214 OFFICE O/P EST MOD 30 MIN: CPT | Mod: 25

## 2022-04-05 PROCEDURE — 36415 COLL VENOUS BLD VENIPUNCTURE: CPT

## 2022-04-05 NOTE — ASSESSMENT
[FreeTextEntry1] : Normocytic anemia\par Has a component of iron deficiency\par Plan was for IV venofer 200 mg x 3. He did not schedule \par PSMA PET rules out bone metastases \par Discussed about bone marrow and the MDS trial. \par He wants to get the venofer and reassess in few months\par If not improvement, he will consider bone marrow\par \par Neutropenia\par Eosinophilia\par Discussed about differential diagnosis\par Blood work including flow cytometry and BCR-ABL- normal\par \par Prostate cancer \par Diagnosed when he was 42 years old\par Had radiation (40 sessions) \par In 2018 - PSA went up to 52 -> went to Dr Montgomery (Urology, [facility])\par Had axumin PET/CT which was negative for metastases\par PSA improved from 19 and latest was 8.4\par Repeat PSA rising\par PSMA PET CT- prostate mass. No distant metastases\par \par Fatigue\par Likely from lupron, iron deficiency\par \par Family ho cancer\par Maternal uncle - colon cancer 69 years old\par Mother - breast cancer 70s\par Paternal cousin - stomach cancer\par Genetic testing at some point\par \par Patient and his wife had multiple questions which were answered to satisfaction\par Carmen (wife)-  for 3 medical practices- offered most information and seemed to be the decision maker\par \par Follow up in 2 months\par CBC, CMP, iron studies, ferritin, PSA

## 2022-04-05 NOTE — CONSULT LETTER
[FreeTextEntry3] : Brown Wheeler MD, MPH\par  of Medicine [facility] [employer] at [facility]\par Attending Physician \par Hematology and Medical Oncology\par [facility]\par

## 2022-04-05 NOTE — HISTORY OF PRESENT ILLNESS
[de-identified] : Mr. Hank Mueller is 81 year old male with normocytic anemia, neutropenia  referred by Dr. Ruy Perez\par \par Patient with past medical history of HTN, hyperlipidemia, type 2 diabetes, multiple TIA,  prostate cancer (dx at age 42 s/p radiation) with elevation PSA of 52 last year,  received 4 Lupron injection thus far with Dr. Randolph Montgomery in [place], last PSA was 8. 49. He has had both Pet axumin and CT done which were neg for metastatic disease, last scans were about three years ago. \par \par 9/20/21 Hgb 12.8 hct 39.3 ANC 1.40 Eosinophils # 1.06\par \par FHx: M. uncle with colon cancer in his 60s.\par Mother with breast cancer at age \par Have not had a colonoscopy, had cologuard at home that was ordered by Dr. Perez which patient has not done. \par Patient with hot flashes and fatigue since receiving Lupron. \par  [de-identified] : Patient is seen today for follow up\par Accompanied by his wife (Carmen)\par \par He has not had any IV iron. Plan was for venofer 200 mg x 3\par Feels tired and fatigued\par Gets lupron with his urologist-> last was 12/18/21. Gets  3 month depot. HAs not had further injections as his lupron did not cover Dr Montgomery (urology)\par On plaquenil 200 mg for possible RA with Dr Ott\par \par PSMA PET (3/22/22)\par Prominent foci of symmetric uptake at prostate base. No metastases\par

## 2022-04-27 ENCOUNTER — APPOINTMENT (OUTPATIENT)
Dept: RHEUMATOLOGY | Facility: CLINIC | Age: 82
End: 2022-04-27

## 2024-05-26 ENCOUNTER — NON-APPOINTMENT (OUTPATIENT)
Age: 84
End: 2024-05-26

## 2024-07-17 ENCOUNTER — NON-APPOINTMENT (OUTPATIENT)
Age: 84
End: 2024-07-17

## 2024-08-10 ENCOUNTER — NON-APPOINTMENT (OUTPATIENT)
Age: 84
End: 2024-08-10